# Patient Record
Sex: MALE | HISPANIC OR LATINO | URBAN - METROPOLITAN AREA
[De-identification: names, ages, dates, MRNs, and addresses within clinical notes are randomized per-mention and may not be internally consistent; named-entity substitution may affect disease eponyms.]

---

## 2017-11-09 ENCOUNTER — EMERGENCY (EMERGENCY)
Facility: HOSPITAL | Age: 42
LOS: 0 days | Discharge: HOME | End: 2017-11-09
Attending: EMERGENCY MEDICINE | Admitting: EMERGENCY MEDICINE
Payer: COMMERCIAL

## 2017-11-09 VITALS
OXYGEN SATURATION: 99 % | DIASTOLIC BLOOD PRESSURE: 102 MMHG | RESPIRATION RATE: 18 BRPM | SYSTOLIC BLOOD PRESSURE: 161 MMHG | HEART RATE: 70 BPM

## 2017-11-09 VITALS — WEIGHT: 225.09 LBS

## 2017-11-09 DIAGNOSIS — S49.82XA OTHER SPECIFIED INJURIES OF LEFT SHOULDER AND UPPER ARM, INITIAL ENCOUNTER: ICD-10-CM
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
## 2017-11-09 DIAGNOSIS — M54.9 DORSALGIA, UNSPECIFIED: ICD-10-CM

## 2017-11-09 PROCEDURE — 71250 CT THORAX DX C-: CPT | Mod: 26

## 2017-11-09 PROCEDURE — 74176 CT ABD & PELVIS W/O CONTRAST: CPT | Mod: 26

## 2017-11-09 PROCEDURE — 99284 EMERGENCY DEPT VISIT MOD MDM: CPT

## 2017-11-09 PROCEDURE — 73030 X-RAY EXAM OF SHOULDER: CPT | Mod: 26,LT

## 2017-11-09 RX ORDER — OXYCODONE HYDROCHLORIDE 5 MG/1
5 TABLET ORAL ONCE
Qty: 0 | Refills: 0 | Status: DISCONTINUED | OUTPATIENT
Start: 2017-11-09 | End: 2017-11-09

## 2017-11-09 RX ADMIN — OXYCODONE HYDROCHLORIDE 5 MILLIGRAM(S): 5 TABLET ORAL at 18:47

## 2017-11-09 NOTE — ED STATDOCS - PROGRESS NOTE DETAILS
No fracture to the shoulder, CT results pending.  BP improved drastically, patient is comfortable and awaiting results -Trey Anderson PA-C REviewed CT results with patient, no acute traumatic injury, patient feeling better secondary to pain medication.  Will send rx for motrin and percocet for pain and rec PMD f/u -Trey Anderson PA-C REviewed CT results with patient, no acute traumatic injury, patient feeling better secondary to pain medication.  Patient will f/u with PMD regarding elevated BP.  He has no headache, CP or dizziness at this time.  Will send rx for motrin and percocet for pain and rec PMD f/u -Trey Anderson PA-C

## 2017-11-09 NOTE — ED STATDOCS - GASTROINTESTINAL, MLM
abdomen soft, +LUQ tenderness, non-distended. Bowel sounds present. abdomen soft, +LUQ/left lower thoracic wall tenderness, non-distended. Bowel sounds present.

## 2017-11-09 NOTE — ED STATDOCS - CONSTITUTIONAL, MLM
normal... well appearing, well nourished, and in no apparent distress. well appearing, well nourished, and in no apparent distress. Nontoxic appearing.

## 2017-11-09 NOTE — ED STATDOCS - NEUROLOGICAL, MLM
sensation is normal and strength is normal. sensation is normal and strength is normal. CNs 2-12 intact. Intact sensation.

## 2017-11-09 NOTE — ED STATDOCS - NS_ ATTENDINGSCRIBEDETAILS _ED_A_ED_FT
I Marek Fenton MD saw and examined this patient. Scribe documented for me and under my supervision. I have modified the scribe's documentation where necessary to reflect my history, physical exam findings and other relevant documentation.
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
## 2017-11-09 NOTE — ED STATDOCS - ATTENDING CONTRIBUTION TO CARE
I Marek Fenton MD saw and examined this patient. MLP saw and examined the patient under my supervision. I have discussed the care of the patient with MLP and agree with MLP's plan, assessment and care of the patient.
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
## 2017-11-09 NOTE — ED STATDOCS - MUSCULOSKELETAL, MLM
Tenderness to left shoulder, left anterior chest wall. Tenderness to left shoulder, left anterior chest wall. 5/5 strength on flexion and extension of both limbs upper and lower extremities.
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
## 2021-06-25 PROBLEM — Z00.00 ENCOUNTER FOR PREVENTIVE HEALTH EXAMINATION: Status: ACTIVE | Noted: 2021-06-25

## 2021-07-01 ENCOUNTER — NON-APPOINTMENT (OUTPATIENT)
Age: 46
End: 2021-07-01

## 2021-07-01 ENCOUNTER — APPOINTMENT (OUTPATIENT)
Dept: ORTHOPEDIC SURGERY | Facility: CLINIC | Age: 46
End: 2021-07-01
Payer: MEDICAID

## 2021-07-01 VITALS
BODY MASS INDEX: 34.1 KG/M2 | HEART RATE: 73 BPM | HEIGHT: 68 IN | SYSTOLIC BLOOD PRESSURE: 146 MMHG | WEIGHT: 225 LBS | DIASTOLIC BLOOD PRESSURE: 94 MMHG

## 2021-07-01 PROCEDURE — 73560 X-RAY EXAM OF KNEE 1 OR 2: CPT | Mod: RT

## 2021-07-01 PROCEDURE — 20610 DRAIN/INJ JOINT/BURSA W/O US: CPT | Mod: RT

## 2021-07-01 PROCEDURE — 99204 OFFICE O/P NEW MOD 45 MIN: CPT | Mod: 25

## 2021-07-12 ENCOUNTER — APPOINTMENT (OUTPATIENT)
Dept: ORTHOPEDIC SURGERY | Facility: CLINIC | Age: 46
End: 2021-07-12
Payer: MEDICAID

## 2021-07-12 VITALS
HEART RATE: 71 BPM | DIASTOLIC BLOOD PRESSURE: 100 MMHG | WEIGHT: 225 LBS | SYSTOLIC BLOOD PRESSURE: 148 MMHG | BODY MASS INDEX: 34.1 KG/M2 | HEIGHT: 68 IN

## 2021-07-12 DIAGNOSIS — M17.11 UNILATERAL PRIMARY OSTEOARTHRITIS, RIGHT KNEE: ICD-10-CM

## 2021-07-12 DIAGNOSIS — Z78.9 OTHER SPECIFIED HEALTH STATUS: ICD-10-CM

## 2021-07-12 DIAGNOSIS — S83.206A UNSPECIFIED TEAR OF UNSPECIFIED MENISCUS, CURRENT INJURY, RIGHT KNEE, INITIAL ENCOUNTER: ICD-10-CM

## 2021-07-12 PROCEDURE — 99212 OFFICE O/P EST SF 10 MIN: CPT

## 2021-07-12 NOTE — DISCUSSION/SUMMARY
[de-identified] : At this time, due to osteoarthritis with possible meniscal tear, I recommend ice and elevation.  He will be reassessed in two to three weeks to discuss the potential for intervention, including an MRI.

## 2021-07-12 NOTE — ADDENDUM
[FreeTextEntry1] : This note was written by Dora Vieira on 07/12/2021 acting as a scribe for ARIC TREJO III, MD

## 2021-07-12 NOTE — HISTORY OF PRESENT ILLNESS
[de-identified] : The patient comes in today for his right knee.  He states that he twisted his knee and had an onset of pain and swelling. The patient states the pain is constant.  The patient describes the pain as throbbing.  The patient notes heat makes his symptoms better, while bending makes his symptoms worse. The patient indicates a pain level of 8 on a pain scale of 0-10. [de-identified] : Tylenol

## 2021-07-12 NOTE — PHYSICAL EXAM
[de-identified] : Left Knee: \par Range of Motion in Degrees	\par 	                  Claimant:	Normal:	\par Flexion Active	  135 	                135-degrees	\par Flexion Passive	  135	                135-degrees	\par Extension Active	  0-5	                0-5-degrees	\par Extension Passive	  0-5	                0-5-degrees	\par \par No weakness to flexion/extension.  No evidence of instability in the AP plane or varus or valgus stress.  Negative  Lachman.  Negative pivot shift.  Negative anterior drawer test.  Negative posterior drawer test.  Negative Jessi.  Negative Apley grind.  No medial or lateral joint line tenderness.  No tenderness over the medial and lateral facet of the patella.  No patellofemoral crepitations.  No lateral tilting patella.  No patellar apprehension.  No crepitation in the medial and lateral femoral condyle.  No proximal or distal swelling, edema or tenderness.  No gross motor or sensory deficits.  No intra-articular swelling.  2+ DP and PT pulses. No varus or valgus malalignment.  Skin is intact.  No rashes, scars or lesions. \par \par Right Knee: \par Range of Motion in Degrees	\par 	                  Claimant:	Normal:	\par Flexion Active	  135 	               135-degrees	\par Flexion Passive	  135	               135-degrees	\par Extension Active	  0-5	               0-5-degrees	\par Extension Passive     0-5	               0-5-degrees	\par \par No weakness to flexion/extension.  No evidence of instability in the AP plane or varus or valgus stress.  Negative  Lachman.  Negative pivot shift.  Negative anterior drawer test.  Negative posterior drawer test.  Positive Jessi.  Positive Apley grind.  Positive medial joint line tenderness.  Negative lateral joint line tenderness.  Positive tenderness over the medial and lateral facet of the patella.  Positive patellofemoral crepitations.  No lateral tilting patella.  No patellar apprehension.  Positive crepitation in the medial and lateral femoral condyle.  No proximal or distal swelling, edema or tenderness.  No gross motor or sensory deficits.  Moderate effusion.  2+ DP and PT pulses.  No varus or valgus malalignment.  Skin is intact.  No rashes, scars or lesions.  \par   [de-identified] : Ambulating with a slightly antalgic to antalgic gait.  Station:  Normal.  [de-identified] : Appearance:  Well-developed, well-nourished male in no acute distress.\par   [de-identified] : Radiographs, two views of the right knee, show mild to early moderate degenerative changes.

## 2021-07-12 NOTE — PROCEDURE
[de-identified] : Consent: \par At this time, I have recommended an injection to the right knee.  The risks and benefits of the procedure were discussed with the patient in detail.  Upon verbal consent of the patient, we proceeded with the injection as noted below.  \par \par Procedure:  \par After a sterile prep, the patient underwent an injection of 9 cc of 1% Lidocaine without epinephrine and 1 cc of Kenalog into the right knee.  The patient tolerated the procedure well.  There were no complications.  \par

## 2021-07-15 NOTE — PHYSICAL EXAM
[de-identified] : Right knee:\par Knee: Range of Motion in Degrees	\par 	                  Claimant:	Normal:	\par Flexion Active	  135 	                135-degrees	\par Flexion Passive	  135	                135-degrees	\par Extension Active	  0-5	                0-5-degrees	\par Extension Passive	  0-5	                0-5-degrees	\par \par No weakness to flexion/extension.  No evidence of instability in the AP plane or varus or valgus stress.  Negative  Lachman.  Negative pivot shift.  Negative anterior drawer test.  Negative posterior drawer test.  Negative Jessi.  Negative Apley grind.  No medial or lateral joint line tenderness.  No tenderness over the medial and lateral facet of the patella.  No patellofemoral crepitations.  No lateral tilting patella.  No patellar apprehension.  No crepitation in the medial and lateral femoral condyle.  No proximal or distal swelling, edema or tenderness.  No gross motor or sensory deficits.  No intra-articular swelling.  2+ DP and PT pulses. No varus or valgus malalignment.  Skin is intact.  No rashes, scars or lesions.  \par   [de-identified] : Gait: normal    Station: normal  [de-identified] : Appearance: Well-developed, well-nourished male  in no acute distress.

## 2021-07-15 NOTE — HISTORY OF PRESENT ILLNESS
[de-identified] : Link comes in today for his right knee.  He is with his son, who is acting as his .  He states he feels great.

## 2021-07-15 NOTE — DISCUSSION/SUMMARY
[de-identified] : The patient is doing well.  At this time return to full activities and follow up with me on an as needed basis.

## 2021-07-15 NOTE — ADDENDUM
[FreeTextEntry1] : This note was written by Pati Costa on 07/15/2021 acting as scribe for Cory Kelly III, MD

## 2021-08-30 ENCOUNTER — APPOINTMENT (OUTPATIENT)
Dept: ORTHOPEDIC SURGERY | Facility: CLINIC | Age: 46
End: 2021-08-30
Payer: MEDICAID

## 2021-08-30 VITALS
SYSTOLIC BLOOD PRESSURE: 160 MMHG | BODY MASS INDEX: 34.1 KG/M2 | DIASTOLIC BLOOD PRESSURE: 87 MMHG | HEIGHT: 68 IN | WEIGHT: 225 LBS | HEART RATE: 77 BPM

## 2021-08-30 PROCEDURE — 20610 DRAIN/INJ JOINT/BURSA W/O US: CPT | Mod: LT

## 2021-08-30 PROCEDURE — 99214 OFFICE O/P EST MOD 30 MIN: CPT | Mod: 25

## 2021-08-30 PROCEDURE — 73560 X-RAY EXAM OF KNEE 1 OR 2: CPT | Mod: LT

## 2021-09-16 NOTE — HISTORY OF PRESENT ILLNESS
[de-identified] : The patient comes in today with persistent complaints of pain to his left knee.  He states his right knee is feeling good.

## 2021-09-16 NOTE — ADDENDUM
[FreeTextEntry1] : This note was written by Jaye Villafana on 09/07/2021 acting as scribe for Cory Kelly III, MD

## 2021-09-16 NOTE — PROCEDURE
[de-identified] : Consent: \par At this time, I have recommended an injection to the left knee.  The risks and benefits of the procedure were discussed with the patient in detail.  Upon verbal consent of the patient, we proceeded with the injection as noted below.  \par \par Procedure:  \par After a sterile prep, the patient underwent an injection of 9 cc of 1% Lidocaine without epinephrine and 1 cc of Kenalog into the left knee.  The patient tolerated the procedure well.  There were no complications.  \par \par

## 2021-09-16 NOTE — PHYSICAL EXAM
[de-identified] : Left Knee:\par Knee: Range of Motion in Degrees	\par 	                  Claimant:	Normal:	\par Flexion Active	  135 	               135-degrees	\par Flexion Passive	  135	               135-degrees	\par Extension Active	  0-5	               0-5-degrees	\par Extension Passive     0-5	               0-5-degrees	\par \par No weakness to flexion/extension.  No evidence of instability in the AP plane or varus or valgus stress.  Negative  Lachman.  Negative pivot shift.  Negative anterior drawer test.  Negative posterior drawer test.  Positive Jessi.  Positive Apley grind.  Positive medial joint line tenderness.  Negative lateral joint line tenderness.  Positive tenderness over the medial and lateral facet of the patella.  Positive patellofemoral crepitations.  No lateral tilting patella.  No patellar apprehension.  Positive crepitation in the medial and lateral femoral condyle.  No proximal or distal swelling, edema or tenderness.  No gross motor or sensory deficits.  Mild intra-articular swelling.  2+ DP and PT pulses.  No varus or valgus malalignment.  Skin is intact.  No rashes, scars or lesions.\par   [de-identified] : Gait and Station:  Ambulating with a slightly antalgic to antalgic gait.  Station:  Normal.  [de-identified] : Appearance:  Well-developed, well-nourished male in no acute distress.\par   [de-identified] : Radiographs, one to two views of the left knee, show moderate degenerative changes.

## 2021-09-16 NOTE — DISCUSSION/SUMMARY
[de-identified] : At this time, due to osteoarthritis of the left knee, the patient was given a cortisone injection.  I recommend ice, elevation and reassessment in 3-4 weeks.

## 2021-09-27 ENCOUNTER — APPOINTMENT (OUTPATIENT)
Dept: ORTHOPEDIC SURGERY | Facility: CLINIC | Age: 46
End: 2021-09-27
Payer: MEDICAID

## 2021-09-27 ENCOUNTER — TRANSCRIPTION ENCOUNTER (OUTPATIENT)
Age: 46
End: 2021-09-27

## 2021-09-27 VITALS
SYSTOLIC BLOOD PRESSURE: 152 MMHG | DIASTOLIC BLOOD PRESSURE: 94 MMHG | HEIGHT: 68 IN | HEART RATE: 76 BPM | BODY MASS INDEX: 34.1 KG/M2 | WEIGHT: 225 LBS

## 2021-09-27 DIAGNOSIS — M17.12 UNILATERAL PRIMARY OSTEOARTHRITIS, LEFT KNEE: ICD-10-CM

## 2021-09-27 PROCEDURE — 99212 OFFICE O/P EST SF 10 MIN: CPT

## 2021-09-30 NOTE — DISCUSSION/SUMMARY
[de-identified] : At this time, I instructed the patient on home therapeutic modalities.  He will follow up with me on an as needed basis for his left knee osteoarthritis.

## 2021-09-30 NOTE — PHYSICAL EXAM
[Normal] : Gait: normal [de-identified] : Left Knee:\par Knee: Range of Motion in Degrees	\par 	                  Claimant:	Normal:	\par Flexion Active	  135 	                135-degrees	\par Flexion Passive	  135	                135-degrees	\par Extension Active	  0-5	                0-5-degrees	\par Extension Passive	  0-5	                0-5-degrees	\par \par No weakness to flexion/extension.  No evidence of instability in the AP plane or varus or valgus stress.  Negative  Lachman.  Negative pivot shift.  Negative anterior drawer test.  Negative posterior drawer test.  Negative Jessi.  Negative Apley grind.  No medial or lateral joint line tenderness.  Positive tenderness over the medial and lateral facet of the patella.  Positive patellofemoral crepitations.  No lateral tilting patella.  No patella apprehension.  Positive crepitation in the medial and lateral femoral condyle.  No proximal or distal swelling, edema or tenderness.  No gross motor or sensory deficits.  Mild intra-articular swelling.  2+ DP and PT pulses.  No varus or valgus malalignment.  Skin is intact.  No rashes, scars or lesions.  \par   [de-identified] : Station:  Normal.  [de-identified] : Appearance:  Well-developed, well-nourished male in no acute distress.\par

## 2021-09-30 NOTE — ADDENDUM
[FreeTextEntry1] : This note was written by Jaye Villafana on 09/30/2021 acting as scribe for Cory Kelly III, MD

## 2023-02-02 ENCOUNTER — NON-APPOINTMENT (OUTPATIENT)
Age: 48
End: 2023-02-02

## 2023-02-03 ENCOUNTER — NON-APPOINTMENT (OUTPATIENT)
Age: 48
End: 2023-02-03

## 2023-02-03 ENCOUNTER — APPOINTMENT (OUTPATIENT)
Dept: INTERNAL MEDICINE | Facility: CLINIC | Age: 48
End: 2023-02-03
Payer: MEDICAID

## 2023-02-03 VITALS
TEMPERATURE: 98.1 F | OXYGEN SATURATION: 97 % | WEIGHT: 228 LBS | HEIGHT: 64.96 IN | SYSTOLIC BLOOD PRESSURE: 160 MMHG | HEART RATE: 79 BPM | BODY MASS INDEX: 37.99 KG/M2 | RESPIRATION RATE: 16 BRPM | DIASTOLIC BLOOD PRESSURE: 84 MMHG

## 2023-02-03 DIAGNOSIS — R05.3 CHRONIC COUGH: ICD-10-CM

## 2023-02-03 PROCEDURE — 99204 OFFICE O/P NEW MOD 45 MIN: CPT | Mod: 25

## 2023-02-03 PROCEDURE — 94010 BREATHING CAPACITY TEST: CPT

## 2023-02-03 RX ORDER — LISINOPRIL 5 MG/1
5 TABLET ORAL TWICE DAILY
Refills: 0 | Status: DISCONTINUED | COMMUNITY

## 2023-02-03 RX ORDER — LOSARTAN POTASSIUM 50 MG/1
50 TABLET, FILM COATED ORAL
Qty: 90 | Refills: 0 | Status: ACTIVE | COMMUNITY
Start: 2023-02-03 | End: 1900-01-01

## 2023-02-03 NOTE — HISTORY OF PRESENT ILLNESS
[TextBox_4] : Today's visit was done with a  #[id number] and then just  [id number].\par \par This is a 48-year-old male with history of obesity, hypertension, hyperlipidemia.  He has noted cough for approximately 6 months.  This occurs especially at nighttime.  It is dry.  He is not noting postnasal drip or reflux.  He is not noting wheezing or shortness of breath.  He has not noted any fever, chest pain, or palpitations.\par \par He has been on lisinopril larger than 6 months but feels that the cough may be secondary to this.  His PCP Dr. Lozano, decrease his lisinopril to 5 mg p.o. twice daily recently and his cough is only slightly better.\par \par He has no history of asthma.  He has no history of reflux.\par \par Patient never smoked cigarettes.  He does not drink alcohol.

## 2023-02-03 NOTE — ASSESSMENT
[FreeTextEntry1] : #1  Ongoing cough for approximately 6 months.  Consider secondary to lisinopril.  Patient will discontinue lisinopril and begin losartan 50 mg p.o. daily.  He will follow-up with Dr. Lozano, his PCP in 2 weeks.  Recommend return visit at anytime if the patient's symptom is not improving/resolving.   \par \par #2 HTN.  See medication change above.  Continue other blood pressure medicines, amlodipine, hydrochlorothiazide.\par \par #3 HPL.

## 2023-02-03 NOTE — PROCEDURE
[FreeTextEntry1] : Spirometry today is within normal limits with an FEV1 of 3.17 or 94% predicted.\par \par Recheck blood pressure 118/78 right arm large cuff.

## 2023-09-25 NOTE — ED ADULT TRIAGE NOTE - NS ED NURSE BANDS TYPE
SUPERVISING PHYSICIAN:  Leonides Sauceda MD    CHIEF COMPLAINT:  Approximately 1.5 week follow-up visit status post wide excision of a recurrent basal cell carcinoma of the mid back.    HISTORY OF PRESENT ILLNESS:  Junior is a 65 year old male who presents to the clinic today for a follow up visit. The above stated procedure was performed on 9/13/23.  Since that time, patient reports he has been doing okay.  He reports trace discharge from the incision site. He has been keeping the incision covered to help prevent saturation of his T-shirt.  He denies significant active bleeding or thick purulent discharge of any kind. No significant tenderness.  He states it has been mostly irritating as his shirt rubs over it when he is active.  No fever or chills.  His chief concern at this time is having sutures removed.    PHYSICAL EXAM:  GENERAL:  Pleasant, 65-year-old man, who is well-appearing, alert and oriented x 3, and in no acute distress.  VITAL SIGNS:   Visit Vitals  Temp 96 °F (35.6 °C)   Ht 6' 3\" (1.905 m)   BMI 27.20 kg/m²     RESPIRATORY:  Respiratory effort currently nonlabored on room air.  WOUND:  Incision site appreciated throughout the low mid back appears approximately 10 cm in length with nylon sutures and 1 Prolene suture in place at this time.  All but 2 sutures were removed from the wound given concern for premature opening. The middle Prolene suture and 1 nylon suture inferior to this were left in place at this time. Patient does have a small opening throughout the middle portion of the wound which extends approximately 5 mm with very trace serous discharge appreciated. Trace pink coloration appreciated throughout the lateral wound edges secondary to irritation from the suture.   The wound was copiously irrigated with sterile saline and alcohol wipes.  Hydrogel was placed in the base of the wound nearest the center where patient had trace opening appreciated.  Steri-Strips were placed to help keep the  wound as best approximated as possible to prevent premature opening post suture removal. Remaining sutures will be removed in the next 3 days.  A Mepilex dressing was placed.  SKIN:  Pink, warm, and dry.    SURGICAL PATHOLOGY: collected 9/13/23:  Pathologic Diagnosis   Skin and superficial subcutis, mid back, excision:   -Keloid scar and residual superficial pattern basal cell carcinoma, completely but narrowly excised  -Incidental lentigines     ASSESSMENT:  Junior is a 65 year old male who presents for his approximately 1.5 week follow-up visit status post wide excision of a recurrent basal cell carcinoma of the mid back. Patient is having a fairly satisfactory post-operative course at this time. His mid back lower incision site appears to be healing nicely, however there is a trace opening 5mm deep throughout the central portion of the wound. The wound was copiously irrigated with sterile saline and cleansed with alcohol wipes. A small amount of hydrogel was placed in the central opening and two sutures near the opening were left in place to prevent premature opening of the wound. Otherwise, the remainder of his nylon sutures were removed at this time and steri strips were placed. Surgical pathology results discussed with the patient indicating complete, but narrowly excised basal cell carcinoma.     PLAN:   Incision:  Keep the incision clean and dry and continue to monitor the incision.  If noticing any developing erythema, increased tenderness, swelling, drainage, fevers or chills, he is to notify our office. No swimming in a pool, lake, hot tub, or ocean X 2 weeks. Recommend keeping the wound covered for the next 24 hours.  Activity:  Recommend no excessive turning or bending to avoid premature opening of the excision site over the next week.   Pain:  Recommend taking Tylenol, 650 mg PO q6h PRN for pain. Do not exceed 4,000 mg of acetaminophen in 24 hours.   Follow-up:  I will have the patient follow up with  our office in 3 days for re-evaluation and possible suture removal at that time. The patient is encouraged to call our office with any questions or concerns. Patient understands to report to the emergency room should he have fever, chills, or increased back pain. The patient will need to follow-up with our office for a repeat skin check in 6 months with a chest x-ray at that time.     Patient understands and agrees with the above plan.     Lupe Lopez PA-C  9/25/2023 9:41 PM  Patient seen under the supervision of Leonides Sauceda MD                 Name band;

## 2024-02-19 ENCOUNTER — APPOINTMENT (OUTPATIENT)
Dept: INTERNAL MEDICINE | Facility: CLINIC | Age: 49
End: 2024-02-19
Payer: MEDICAID

## 2024-02-19 VITALS
BODY MASS INDEX: 39.51 KG/M2 | OXYGEN SATURATION: 96 % | RESPIRATION RATE: 16 BRPM | SYSTOLIC BLOOD PRESSURE: 130 MMHG | TEMPERATURE: 99.2 F | HEIGHT: 65.35 IN | HEART RATE: 80 BPM | WEIGHT: 240 LBS | DIASTOLIC BLOOD PRESSURE: 82 MMHG

## 2024-02-19 PROCEDURE — 99203 OFFICE O/P NEW LOW 30 MIN: CPT

## 2024-02-19 PROCEDURE — 99213 OFFICE O/P EST LOW 20 MIN: CPT

## 2024-02-19 RX ORDER — HYDROCHLOROTHIAZIDE 25 MG/1
25 TABLET ORAL DAILY
Refills: 0 | Status: ACTIVE | COMMUNITY

## 2024-02-19 RX ORDER — ATORVASTATIN CALCIUM 10 MG/1
10 TABLET, FILM COATED ORAL DAILY
Refills: 0 | Status: ACTIVE | COMMUNITY

## 2024-02-19 RX ORDER — AMLODIPINE BESYLATE 10 MG/1
10 TABLET ORAL DAILY
Refills: 0 | Status: ACTIVE | COMMUNITY

## 2024-02-19 NOTE — ASSESSMENT
[FreeTextEntry1] : Mr. Griffith is a 49-year-old male with a history of hypertension requiring 3 medications.  Patient also has a history of snoring.  He denies significant daytime tiredness.  He is obese with a BMI of 39.5.  Patient will be scheduled for home polysomnography examination.  I have explained that the mainstay of treatment is CPAP.  Mr. Griffith will follow-up in 3 to 4 months for assessment of CPAP therapy if polysomnography examination is positive.

## 2024-02-19 NOTE — HISTORY OF PRESENT ILLNESS
[TextBox_4] : Mr. Nguyen is a 49-year-old male who presents for initial evaluation for assessment for obstructive sleep apnea.  He is accompanied by his son who is translating for him patient speaks [place].  He apparently has difficult to control hypertension.  He is currently on 3 antihypertensive medications.  Mr. Wood is also overweight.  He has been told by his wife that he snores loudly.  There is no history of witnessed apneas.  Mr. Wood denies significant symptoms of daytime tiredness.  His [place] sleepiness score was 6/24 and STOP-BANG score was 3.  He is a lifelong non-smoker.  There is no significant occupational exposure.

## 2024-03-01 ENCOUNTER — APPOINTMENT (OUTPATIENT)
Dept: CARDIOLOGY | Facility: CLINIC | Age: 49
End: 2024-03-01
Payer: MEDICAID

## 2024-03-01 ENCOUNTER — NON-APPOINTMENT (OUTPATIENT)
Age: 49
End: 2024-03-01

## 2024-03-01 DIAGNOSIS — I10 ESSENTIAL (PRIMARY) HYPERTENSION: ICD-10-CM

## 2024-03-01 PROCEDURE — 99204 OFFICE O/P NEW MOD 45 MIN: CPT | Mod: 25

## 2024-03-01 PROCEDURE — 93000 ELECTROCARDIOGRAM COMPLETE: CPT

## 2024-03-01 PROCEDURE — G2211 COMPLEX E/M VISIT ADD ON: CPT | Mod: NC,1L

## 2024-03-01 NOTE — DISCUSSION/SUMMARY
[Patient] : the patient [FreeTextEntry1] : Pt will have an ECHO and Stress to evaluate for exercise induced ECG changes. Pt will attempt to lose weight and treat his FELIPE with the appropriate equipment. Pt will follow up in 2 months.  [FreeTextEntry4] : FELIPE [EKG obtained to assist in diagnosis and management of assessed problem(s)] : EKG obtained to assist in diagnosis and management of assessed problem(s)

## 2024-03-01 NOTE — HISTORY OF PRESENT ILLNESS
[FreeTextEntry1] : 48 yo male presents  for evaluation of cardiac status. Pt has a history of FELIPE and HTN. Pt is obese. Pt denies history of MI or CHF. Labs not available. 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 No tobacco no ETOH.

## 2024-03-20 ENCOUNTER — APPOINTMENT (OUTPATIENT)
Dept: CARDIOLOGY | Facility: CLINIC | Age: 49
End: 2024-03-20
Payer: COMMERCIAL

## 2024-03-20 PROCEDURE — 93015 CV STRESS TEST SUPVJ I&R: CPT

## 2024-03-20 PROCEDURE — 93306 TTE W/DOPPLER COMPLETE: CPT

## 2024-06-19 ENCOUNTER — APPOINTMENT (OUTPATIENT)
Dept: INTERNAL MEDICINE | Facility: CLINIC | Age: 49
End: 2024-06-19
Payer: COMMERCIAL

## 2024-06-19 VITALS
TEMPERATURE: 98.9 F | OXYGEN SATURATION: 96 % | RESPIRATION RATE: 16 BRPM | HEART RATE: 66 BPM | DIASTOLIC BLOOD PRESSURE: 80 MMHG | HEIGHT: 60 IN | SYSTOLIC BLOOD PRESSURE: 120 MMHG | BODY MASS INDEX: 48.29 KG/M2 | WEIGHT: 246 LBS

## 2024-06-19 DIAGNOSIS — Z78.9 OTHER SPECIFIED HEALTH STATUS: ICD-10-CM

## 2024-06-19 DIAGNOSIS — G47.33 OBSTRUCTIVE SLEEP APNEA (ADULT) (PEDIATRIC): ICD-10-CM

## 2024-06-19 PROCEDURE — 99202 OFFICE O/P NEW SF 15 MIN: CPT

## 2024-06-19 PROCEDURE — 99212 OFFICE O/P EST SF 10 MIN: CPT

## 2024-06-19 NOTE — HISTORY OF PRESENT ILLNESS
[TextBox_4] : Patient presents for follow-up evaluation accompanied by his son.  He did not yet have home polysomnography examination.  Patient has a history of hypertension which has been difficult to control.  He is currently on Norvasc, losartan and hydrochlorothiazide.Initial STOP-BANG score was 3 and [place] sleepiness score was 3.

## 2024-06-19 NOTE — DISCUSSION/SUMMARY
[FreeTextEntry1] : Mr. Wood presents for follow-up evaluation accompanied by his son.  I have explained that he needs to have a home polysomnography examination before he can be treated with CPAP.  I have reordered home polysomnography examination and patient will follow-up after results are reviewed.

## 2024-10-22 ENCOUNTER — APPOINTMENT (OUTPATIENT)
Dept: INTERNAL MEDICINE | Facility: CLINIC | Age: 49
End: 2024-10-22
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
